# Patient Record
Sex: MALE | Race: WHITE | Employment: FULL TIME | URBAN - METROPOLITAN AREA
[De-identification: names, ages, dates, MRNs, and addresses within clinical notes are randomized per-mention and may not be internally consistent; named-entity substitution may affect disease eponyms.]

---

## 2017-04-04 ENCOUNTER — OFFICE VISIT (OUTPATIENT)
Dept: FAMILY MEDICINE CLINIC | Age: 52
End: 2017-04-04
Payer: COMMERCIAL

## 2017-04-04 VITALS
WEIGHT: 315 LBS | BODY MASS INDEX: 47 KG/M2 | OXYGEN SATURATION: 95 % | DIASTOLIC BLOOD PRESSURE: 76 MMHG | SYSTOLIC BLOOD PRESSURE: 115 MMHG | HEART RATE: 110 BPM

## 2017-04-04 DIAGNOSIS — I10 ESSENTIAL HYPERTENSION: ICD-10-CM

## 2017-04-04 DIAGNOSIS — E11.9 CONTROLLED TYPE 2 DIABETES MELLITUS WITHOUT COMPLICATION, WITHOUT LONG-TERM CURRENT USE OF INSULIN (HCC): Primary | ICD-10-CM

## 2017-04-04 LAB
CREATININE URINE: 200 MG/DL
HBA1C MFR BLD: 11 %
MICROALBUMIN/CREAT 24H UR: 150 MG/G{CREAT}

## 2017-04-04 PROCEDURE — 99213 OFFICE O/P EST LOW 20 MIN: CPT | Performed by: FAMILY MEDICINE

## 2017-04-04 PROCEDURE — 83036 HEMOGLOBIN GLYCOSYLATED A1C: CPT | Performed by: FAMILY MEDICINE

## 2017-04-04 RX ORDER — BLOOD-GLUCOSE METER
KIT MISCELLANEOUS
Qty: 1 KIT | Refills: 0 | Status: SHIPPED | OUTPATIENT
Start: 2017-04-04

## 2017-04-04 RX ORDER — LOSARTAN POTASSIUM 50 MG/1
50 TABLET ORAL DAILY
Qty: 30 TABLET | Refills: 5 | Status: SHIPPED | OUTPATIENT
Start: 2017-04-04 | End: 2017-07-06 | Stop reason: SDUPTHER

## 2017-04-04 ASSESSMENT — ENCOUNTER SYMPTOMS
SHORTNESS OF BREATH: 0
VISUAL CHANGE: 0
BLURRED VISION: 0

## 2017-04-10 ENCOUNTER — HOSPITAL ENCOUNTER (OUTPATIENT)
Age: 52
Discharge: HOME | End: 2017-04-10
Payer: COMMERCIAL

## 2017-04-10 DIAGNOSIS — E11.9 CONTROLLED TYPE 2 DIABETES MELLITUS WITHOUT COMPLICATION, WITHOUT LONG-TERM CURRENT USE OF INSULIN (HCC): ICD-10-CM

## 2017-04-10 DIAGNOSIS — I10 ESSENTIAL HYPERTENSION: ICD-10-CM

## 2017-04-10 LAB
ABSOLUTE EOS #: 0.6 K/UL (ref 0–0.4)
ABSOLUTE LYMPH #: 2.4 K/UL (ref 0.9–2.5)
ABSOLUTE MONO #: 0.5 K/UL (ref 0–1)
ALBUMIN SERPL-MCNC: 3.7 G/DL (ref 3.5–5.2)
ALBUMIN/GLOBULIN RATIO: ABNORMAL (ref 1–2.5)
ALP BLD-CCNC: 60 U/L (ref 40–129)
ALT SERPL-CCNC: 15 U/L (ref 5–41)
ANION GAP SERPL CALCULATED.3IONS-SCNC: 12 MMOL/L (ref 9–17)
AST SERPL-CCNC: 11 U/L
BASOPHILS # BLD: 1 % (ref 0–2)
BASOPHILS ABSOLUTE: 0.1 K/UL (ref 0–0.2)
BILIRUB SERPL-MCNC: 0.52 MG/DL (ref 0.3–1.2)
BUN BLDV-MCNC: 12 MG/DL (ref 6–20)
BUN/CREAT BLD: 13 (ref 9–20)
CALCIUM SERPL-MCNC: 9.4 MG/DL (ref 8.6–10.4)
CHLORIDE BLD-SCNC: 97 MMOL/L (ref 98–107)
CHOLESTEROL/HDL RATIO: 4.8
CHOLESTEROL: 172 MG/DL
CO2: 28 MMOL/L (ref 20–31)
CREAT SERPL-MCNC: 0.92 MG/DL (ref 0.7–1.2)
DIFFERENTIAL TYPE: YES
EOSINOPHILS RELATIVE PERCENT: 6 % (ref 0–5)
GFR AFRICAN AMERICAN: >60 ML/MIN
GFR NON-AFRICAN AMERICAN: >60 ML/MIN
GFR SERPL CREATININE-BSD FRML MDRD: ABNORMAL ML/MIN/{1.73_M2}
GFR SERPL CREATININE-BSD FRML MDRD: ABNORMAL ML/MIN/{1.73_M2}
GLUCOSE BLD-MCNC: 193 MG/DL (ref 70–99)
HCT VFR BLD CALC: 45.9 % (ref 41–53)
HDLC SERPL-MCNC: 36 MG/DL
HEMOGLOBIN: 15.2 G/DL (ref 13.5–17.5)
LDL CHOLESTEROL: 93 MG/DL (ref 0–130)
LYMPHOCYTES # BLD: 23 % (ref 13–44)
MCH RBC QN AUTO: 29.4 PG (ref 26–34)
MCHC RBC AUTO-ENTMCNC: 33.2 G/DL (ref 31–37)
MCV RBC AUTO: 88.7 FL (ref 80–100)
MONOCYTES # BLD: 5 % (ref 5–9)
PATIENT FASTING?: YES
PDW BLD-RTO: 13.9 % (ref 12.1–15.2)
PLATELET # BLD: 281 K/UL (ref 140–450)
PLATELET ESTIMATE: ABNORMAL
PMV BLD AUTO: ABNORMAL FL (ref 6–12)
POTASSIUM SERPL-SCNC: 4.3 MMOL/L (ref 3.7–5.3)
RBC # BLD: 5.18 M/UL (ref 4.5–5.9)
RBC # BLD: ABNORMAL 10*6/UL
SEG NEUTROPHILS: 65 % (ref 39–75)
SEGMENTED NEUTROPHILS ABSOLUTE COUNT: 6.7 K/UL (ref 2.1–6.5)
SODIUM BLD-SCNC: 137 MMOL/L (ref 135–144)
TOTAL PROTEIN: 7.3 G/DL (ref 6.4–8.3)
TRIGL SERPL-MCNC: 217 MG/DL
VLDLC SERPL CALC-MCNC: 43 MG/DL (ref 1–30)
WBC # BLD: 10.2 K/UL (ref 3.5–11)
WBC # BLD: ABNORMAL 10*3/UL

## 2017-04-10 PROCEDURE — 80053 COMPREHEN METABOLIC PANEL: CPT

## 2017-04-10 PROCEDURE — 36415 COLL VENOUS BLD VENIPUNCTURE: CPT

## 2017-04-10 PROCEDURE — 85025 COMPLETE CBC W/AUTO DIFF WBC: CPT

## 2017-04-10 PROCEDURE — 80061 LIPID PANEL: CPT

## 2017-04-11 ENCOUNTER — TELEPHONE (OUTPATIENT)
Dept: FAMILY MEDICINE CLINIC | Age: 52
End: 2017-04-11

## 2017-04-16 ASSESSMENT — ENCOUNTER SYMPTOMS
ABDOMINAL DISTENTION: 0
TROUBLE SWALLOWING: 0
COLOR CHANGE: 0
VOMITING: 0
WHEEZING: 0
PHOTOPHOBIA: 0
NAUSEA: 0
COUGH: 0
BACK PAIN: 0
FACIAL SWELLING: 0
CONSTIPATION: 0
DIARRHEA: 0
ABDOMINAL PAIN: 0

## 2017-07-06 ENCOUNTER — OFFICE VISIT (OUTPATIENT)
Dept: FAMILY MEDICINE CLINIC | Age: 52
End: 2017-07-06
Payer: COMMERCIAL

## 2017-07-06 VITALS
SYSTOLIC BLOOD PRESSURE: 138 MMHG | WEIGHT: 315 LBS | HEART RATE: 84 BPM | DIASTOLIC BLOOD PRESSURE: 84 MMHG | BODY MASS INDEX: 39.17 KG/M2 | HEIGHT: 75 IN

## 2017-07-06 DIAGNOSIS — I10 ESSENTIAL HYPERTENSION: ICD-10-CM

## 2017-07-06 DIAGNOSIS — E11.9 CONTROLLED TYPE 2 DIABETES MELLITUS WITHOUT COMPLICATION, WITHOUT LONG-TERM CURRENT USE OF INSULIN (HCC): ICD-10-CM

## 2017-07-06 PROCEDURE — 83036 HEMOGLOBIN GLYCOSYLATED A1C: CPT | Performed by: FAMILY MEDICINE

## 2017-07-06 PROCEDURE — 99214 OFFICE O/P EST MOD 30 MIN: CPT | Performed by: FAMILY MEDICINE

## 2017-07-06 RX ORDER — LOSARTAN POTASSIUM 50 MG/1
50 TABLET ORAL DAILY
Qty: 30 TABLET | Refills: 5 | Status: SHIPPED | OUTPATIENT
Start: 2017-07-06 | End: 2017-10-02 | Stop reason: SDUPTHER

## 2017-07-06 RX ORDER — LISINOPRIL 20 MG/1
20 TABLET ORAL DAILY
Qty: 30 TABLET | Refills: 5 | Status: SHIPPED | OUTPATIENT
Start: 2017-07-06 | End: 2017-10-02 | Stop reason: ALTCHOICE

## 2017-07-06 ASSESSMENT — ENCOUNTER SYMPTOMS
ORTHOPNEA: 0
BLURRED VISION: 0
SHORTNESS OF BREATH: 0

## 2017-07-06 ASSESSMENT — PATIENT HEALTH QUESTIONNAIRE - PHQ9
SUM OF ALL RESPONSES TO PHQ QUESTIONS 1-9: 0
SUM OF ALL RESPONSES TO PHQ9 QUESTIONS 1 & 2: 0
2. FEELING DOWN, DEPRESSED OR HOPELESS: 0
1. LITTLE INTEREST OR PLEASURE IN DOING THINGS: 0

## 2017-07-07 LAB — HBA1C MFR BLD: 8.8 %

## 2017-07-09 ASSESSMENT — ENCOUNTER SYMPTOMS
ABDOMINAL DISTENTION: 0
COLOR CHANGE: 0
VOMITING: 0
WHEEZING: 0
NAUSEA: 0
DIARRHEA: 0
COUGH: 0
CONSTIPATION: 0
TROUBLE SWALLOWING: 0
PHOTOPHOBIA: 0
FACIAL SWELLING: 0
ABDOMINAL PAIN: 0
BACK PAIN: 0

## 2017-07-13 ENCOUNTER — TELEPHONE (OUTPATIENT)
Dept: FAMILY MEDICINE CLINIC | Age: 52
End: 2017-07-13

## 2017-08-14 DIAGNOSIS — E11.9 CONTROLLED TYPE 2 DIABETES MELLITUS WITHOUT COMPLICATION, WITHOUT LONG-TERM CURRENT USE OF INSULIN (HCC): ICD-10-CM

## 2017-10-02 ENCOUNTER — OFFICE VISIT (OUTPATIENT)
Dept: FAMILY MEDICINE CLINIC | Age: 52
End: 2017-10-02
Payer: COMMERCIAL

## 2017-10-02 VITALS
WEIGHT: 315 LBS | OXYGEN SATURATION: 97 % | HEART RATE: 96 BPM | BODY MASS INDEX: 49 KG/M2 | SYSTOLIC BLOOD PRESSURE: 122 MMHG | DIASTOLIC BLOOD PRESSURE: 78 MMHG

## 2017-10-02 DIAGNOSIS — Z12.11 SCREENING FOR COLON CANCER: ICD-10-CM

## 2017-10-02 DIAGNOSIS — K64.9 HEMORRHOIDS, UNSPECIFIED HEMORRHOID TYPE: ICD-10-CM

## 2017-10-02 DIAGNOSIS — E11.9 CONTROLLED TYPE 2 DIABETES MELLITUS WITHOUT COMPLICATION, WITHOUT LONG-TERM CURRENT USE OF INSULIN (HCC): Primary | ICD-10-CM

## 2017-10-02 DIAGNOSIS — I10 ESSENTIAL HYPERTENSION: ICD-10-CM

## 2017-10-02 LAB — HBA1C MFR BLD: 8.8 %

## 2017-10-02 PROCEDURE — 99214 OFFICE O/P EST MOD 30 MIN: CPT | Performed by: FAMILY MEDICINE

## 2017-10-02 PROCEDURE — 83036 HEMOGLOBIN GLYCOSYLATED A1C: CPT | Performed by: FAMILY MEDICINE

## 2017-10-02 RX ORDER — LOSARTAN POTASSIUM 50 MG/1
50 TABLET ORAL DAILY
Qty: 30 TABLET | Refills: 5 | Status: SHIPPED | OUTPATIENT
Start: 2017-10-02 | End: 2018-04-23 | Stop reason: SDUPTHER

## 2017-10-02 ASSESSMENT — ENCOUNTER SYMPTOMS
VISUAL CHANGE: 0
SHORTNESS OF BREATH: 0
BLURRED VISION: 0

## 2017-10-02 NOTE — MR AVS SNAPSHOT
type 2 diabetes, stroke, gallstones, arthritis, sleep apnea, and certain cancers. BMI is not perfect. It may overestimate body fat in athletes and people who are more muscular. Even a small weight loss (between 5 and 10 percent of your current weight) by decreasing your calorie intake and becoming more physically active will help lower your risk of developing or worsening diseases associated with obesity. Learn more at: [email or web address]          Instructions      SURVEY:    You may be receiving a survey from [email or web address] regarding your visit today. Please complete the survey to enable us to provide the highest quality of care to you and your family. If you cannot score us as very good on any question, please call the office to discuss how we could have made your experience exceptional.     Thank you. Today's Medication Changes          These changes are accurate as of: 10/2/17  9:35 AM.  If you have any questions, ask your nurse or doctor.                START taking these medications           Dulaglutide 1.5 MG/0.5ML Sopn   Commonly known as:  TRULICITY   Instructions:  Inject 1.5 mg into the skin once a week   Quantity:  4 Pen   Refills:  5   Started by:  Whitney Cazares, DO         STOP taking these medications           lisinopril 20 MG tablet   Commonly known as:  PRINIVIL;ZESTRIL   Stopped by:  Whitney Cazares, DO            Where to Get Your Medications      These medications were sent to [address] [place] [address] [address] [place], [place] - [address]  [address] [address]     Phone:  [phone]     Dulaglutide 1.5 MG/0.5ML Sopn    losartan 50 MG tablet    metFORMIN 1000 MG tablet               Your Current Medications Are              losartan (COZAAR) 50 MG tablet Take 1 tablet by mouth daily    metFORMIN (GLUCOPHAGE) 1000 MG tablet Take 1 tablet by mouth 2 times daily (with meals) Dulaglutide (TRULICITY) 1.5 XR/9.3XU SOPN Inject 1.5 mg into the skin once a week    glucose monitoring kit (FREESTYLE) monitoring kit Test daily and as directed (whichever meter is covered by pt's insurance)    glucose blood VI test strips (ASCENSIA AUTODISC VI;ONE TOUCH ULTRA TEST VI) strip Test daily and as needed (whichever strips are covered by pt's insurance)    Insulin Pen Needle 32G X 4 MM MISC Use daily as directed      Allergies              Penicillins Itching      We Ordered/Performed the following           Jerrod Solitario MD, General Surgery [facility]     Scheduling Instructions:    [address] [facility] Dylan Hull MD  [address] [place] [address]  [phone]    Comments: The patient can be scheduled with any member of the group, including the provider with the first available appointments. POCT glycosylated hemoglobin (Hb A1C)          Result Summary for POCT glycosylated hemoglobin (Hb A1C)      Result Information       Status          Normal Final result (Collected: 10/2/2017  9:20 AM)           10/2/2017  9:20 AM      Component Results     Component Value Ref Range & Units Status    Hemoglobin A1C 8.8 % Final               Additional Information        Basic Information     Date Of Birth Sex Race Ethnicity Preferred Language    1965 Male White Non-/Non  English      Problem List as of 10/2/2017  Date Reviewed: 7/9/2017                Controlled type 2 diabetes mellitus without complication, without long-term current use of insulin ([facility] [address]    Essential hypertension      Preventive Care        Date Due    Hepatitis C screening is recommended for all adults regardless of risk factors born between [facility] at least once (lifetime) who have never been tested.  1965    Diabetic Foot Exam 9/22/1973    Eye Exam By An Eye Doctor 9/22/1973    HIV screening is recommended for all people regardless of risk factors

## 2017-10-02 NOTE — PROGRESS NOTES
HPI Notes    Name: Eder Murphy  : 1965         Chief Complaint:     Chief Complaint   Patient presents with    Diabetes    Hypertension       History of Present Illness:      Eder Murphy is a 45 y.o.  male who presents with Diabetes and Hypertension    Chronic Disease Visit Information    BP Readings from Last 3 Encounters:   10/02/17 122/78   17 138/84   17 115/76          Hemoglobin A1C (%)   Date Value   10/02/2017 8.8   2017 8.8   2017 11.0     LDL Cholesterol (mg/dL)   Date Value   04/10/2017 93     HDL (mg/dL)   Date Value   04/10/2017 36 (L)     BUN (mg/dL)   Date Value   04/10/2017 12     CREATININE (mg/dL)   Date Value   04/10/2017 0.92     Glucose (mg/dL)   Date Value   04/10/2017 193 (H)            Have you changed or started any medications since your last visit including any over-the-counter medicines, vitamins, or herbal medicines? no   Are you having any side effects from any of your medications? -  no  Have you stopped taking any of your medications? Is so, why? -  no    Have you seen any other physician or provider since your last visit? No  Have you had any other diagnostic tests since your last visit? No  Have you been seen in the emergency room and/or had an admission to a hospital since we last saw you? No  Have you had your annual diabetic retinal (eye) exam? Yes  Have you had your routine dental cleaning in the past 6 months? no    Have you activated your [employer] account? If not, what are your barriers?  Yes     Patient Care Team:  Oscar Alvarez DO as PCP - General (Family Medicine)         Medical History Review  Past Medical, Family, and Social History reviewed and does contribute to the patient presenting condition    Health Maintenance   Topic Date Due    Hepatitis C screen  1965    Diabetic foot exam  1973    Diabetic retinal exam  1973    HIV screen  1978    DTaP/Tdap/Td vaccine (1 - Tdap) 1982    evaluated. Patienthad some blood in his stool recently. Patient notes that he is also due for a screening colonoscopy. Patient would like to be referred for this for further evaluation and management. No chest pain, shortness breath, blurred vision, headaches. No other acute problems or complaints. Past Medical History:     Past Medical History:   Diagnosis Date    Chicken pox     Chronic back pain     DM (diabetes mellitus) ([address]     Hypertension       Reviewed all health maintenance requirements and ordered appropriate tests  Health Maintenance Due   Topic Date Due    Hepatitis C screen  1965    Diabetic foot exam  09/22/1973    Diabetic retinal exam  09/22/1973    HIV screen  09/22/1978    DTaP/Tdap/Td vaccine (1 - Tdap) 09/22/1982    Pneumococcal med risk (1 of 1 - PPSV23) 09/22/1982    Flu vaccine (1) 09/01/2017    Colon Cancer Screen FIT/FOBT  10/03/2017       Past Surgical History:     Past Surgical History:   Procedure Laterality Date    SHOULDER SURGERY  2006        Medications:       Prior to Admission medications    Medication Sig Start Date End Date Taking?  Authorizing Provider   losartan (COZAAR) 50 MG tablet Take 1 tablet by mouth daily 10/2/17  Yes Daniela Vital DO   metFORMIN (GLUCOPHAGE) 1000 MG tablet Take 1 tablet by mouth 2 times daily (with meals) 10/2/17  Yes Daniela Vital DO   Dulaglutide (TRULICITY) 1.5 IN/2.8TR SOPN Inject 1.5 mg into the skin once a week 10/2/17  Yes Daniela Vital, DO   glucose monitoring kit (FREESTYLE) monitoring kit Test daily and as directed (whichever meter is covered by pt's insurance) 4/4/17  Yes Daniela Vital DO   glucose blood VI test strips (ASCENSIA AUTODISC VI;ONE TOUCH ULTRA TEST VI) strip Test daily and as needed (whichever strips are covered by pt's insurance) 4/4/17  Yes Daniela Vital, DO   Insulin Pen Needle 32G X 4 MM MISC Use daily as directed 12/29/16  Yes Daniela Vital DO        Allergies: 1000 MG tablet    Dulaglutide (TRULICITY) 1.5 SJ/0.4PT SOPN   2. Essential hypertension  losartan (COZAAR) 50 MG tablet   3. Screening for colon cancer  Jerrod Gurrola MD, General Surgery [facility]   4. Hemorrhoids, unspecified hemorrhoid type  Jerrod Gurrola MD, General Surgery [address]:   1. Diabetes mellitus type 2improving slightly, A1c in the office today was 8.8, will increase patient's Trulicity to 1.5. Continue metformin, continue angiotensin receptor blocker. We'll continue to follow closely. 2.  Hypertensionwell controlled on losartan, will continue and will continue to follow. Labs reviewed. 3.  4.  Screen for colon cancer/hemorrhoidswill refer patient to Gen. surgery for further evaluation and management. Completed Refills   Requested Prescriptions     Signed Prescriptions Disp Refills    losartan (COZAAR) 50 MG tablet 30 tablet 5     Sig: Take 1 tablet by mouth daily    metFORMIN (GLUCOPHAGE) 1000 MG tablet 60 tablet 5     Sig: Take 1 tablet by mouth 2 times daily (with meals)    Dulaglutide (TRULICITY) 1.5 SH/4.3RC SOPN 4 Pen 5     Sig: Inject 1.5 mg into the skin once a week     No Follow-up on file.   Orders Placed This Encounter   Medications    losartan (COZAAR) 50 MG tablet     Sig: Take 1 tablet by mouth daily     Dispense:  30 tablet     Refill:  5    metFORMIN (GLUCOPHAGE) 1000 MG tablet     Sig: Take 1 tablet by mouth 2 times daily (with meals)     Dispense:  60 tablet     Refill:  5    Dulaglutide (TRULICITY) 1.5 TP/7.5IW SOPN     Sig: Inject 1.5 mg into the skin once a week     Dispense:  4 Pen     Refill:  5     Orders Placed This Encounter   Procedures    Jerrod Gurrola MD, General Surgery [facility]     Referral Priority:   Routine     Referral Type:   Consult for Advice and Opinion     Referral Reason:   Specialty Services Required     Referred to Provider:   Goran Brand MD     Requested Specialty:   General Surgery

## 2017-10-15 ASSESSMENT — ENCOUNTER SYMPTOMS
ABDOMINAL DISTENTION: 0
COUGH: 0
DIARRHEA: 0
NAUSEA: 0
COLOR CHANGE: 0
TROUBLE SWALLOWING: 0
WHEEZING: 0
BLOOD IN STOOL: 1
PHOTOPHOBIA: 0
FACIAL SWELLING: 0
CONSTIPATION: 0
VOMITING: 0
ABDOMINAL PAIN: 0
BACK PAIN: 0

## 2017-11-14 ENCOUNTER — INITIAL CONSULT (OUTPATIENT)
Dept: SURGERY | Age: 52
End: 2017-11-14

## 2017-11-14 VITALS — BODY MASS INDEX: 39.17 KG/M2 | WEIGHT: 315 LBS | HEIGHT: 75 IN

## 2017-11-14 DIAGNOSIS — Z80.0 FAMILY HISTORY OF COLON CANCER: ICD-10-CM

## 2017-11-14 DIAGNOSIS — E66.01 MORBID OBESITY WITH BMI OF 45.0-49.9, ADULT (HCC): ICD-10-CM

## 2017-11-14 DIAGNOSIS — Z12.11 ENCOUNTER FOR SCREENING COLONOSCOPY: Primary | ICD-10-CM

## 2017-11-14 PROCEDURE — 99024 POSTOP FOLLOW-UP VISIT: CPT | Performed by: SURGERY

## 2017-11-14 NOTE — LETTER
[id number] [place] [id number]  Phone: [phone]  Fax: [phone]    Becky Escamilla MD        November 26, 2017     DO [address]  [place] [address]    Patient: Hilario Jackson  MR Number: [id number]  YOB: 1965  Date of Visit: 11/14/2017    Dear Dr. Lang Wallis: Thank you for the request for consultation for Zeenat Blake to me for the evaluation of screening colonoscopy. Below are the relevant portions of my assessment and plan of care. Assessment:     1. Encounter for screening colonoscopy  EKG 12 Lead   2. Family history of colon cancer     3. Morbid obesity with BMI of 45.0-49.9, adult [facility])           Plan: Will proceed with a screening colonoscopy over the next few weeks. Risks, benefits, alternatives thoroughly reviewed and accepted by Mr Alejandro Reyes, including remote risk of GI bleeding, perforation, missed lesions, etc.   If you have questions, please do not hesitate to call me. I look forward to following Shayy Ward along with you.     Sincerely,        Becky Escamilla MD

## 2017-11-26 ASSESSMENT — ENCOUNTER SYMPTOMS
SORE THROAT: 0
SHORTNESS OF BREATH: 0
VOMITING: 0
BLOOD IN STOOL: 0
BACK PAIN: 0
ABDOMINAL PAIN: 0
COUGH: 0
TROUBLE SWALLOWING: 0
NAUSEA: 0
CHOKING: 0

## 2017-11-27 NOTE — PROGRESS NOTES
Subjective:      Patient ID: Zoey Sexton is a 45 y.o. male. HPI     Mr Aiyana Villalba is a 44 yo WM kindly referred to me by Dr Belinda Wilson for a screening colonoscopy. He has no complaints. No abdominal pain. No recent weight changes, stable at 380 lbs. Daily BM's, without blood. Colonoscopy  was normal, as he recalls. Mother with history of colon polyps, paternal grandfather  in his 66's with colon cancer. He does not smoke. Review of Systems   Constitutional: Negative for activity change, appetite change, chills, fever and unexpected weight change. HENT: Negative for nosebleeds, sneezing, sore throat and trouble swallowing. Eyes: Negative for visual disturbance. Respiratory: Negative for cough, choking and shortness of breath. Cardiovascular: Negative for chest pain, palpitations and leg swelling. Gastrointestinal: Negative for abdominal pain, blood in stool, nausea and vomiting. Genitourinary: Negative for dysuria, flank pain and hematuria. Musculoskeletal: Positive for arthralgias. Negative for back pain, gait problem and myalgias. Allergic/Immunologic: Negative for immunocompromised state. Neurological: Negative for dizziness, seizures, syncope, weakness and headaches. Hematological: Does not bruise/bleed easily. Psychiatric/Behavioral: Negative for confusion and sleep disturbance.         Past Medical History:   Diagnosis Date    Chicken pox     Chronic back pain     DM (diabetes mellitus) ([facility] [address]     Hypertension        Past Surgical History:   Procedure Laterality Date    COLONOSCOPY      SHOULDER SURGERY         Family History   Problem Relation Age of Onset    Diabetes Mother     High Blood Pressure Mother        Allergies:  See list    Current Outpatient Prescriptions   Medication Sig Dispense Refill    losartan (COZAAR) 50 MG tablet Take 1 tablet by mouth daily 30 tablet 5    metFORMIN (GLUCOPHAGE) 1000 MG tablet Take 1 tablet by mouth 2 times daily erythema. Psychiatric: He has a normal mood and affect. His behavior is normal. Judgment and thought content normal.   Nursing note and vitals reviewed.     4/10/2017 10:54 AM - [facility] Incoming Lab Results From [facility]     Component Results     Component Value Ref Range & Units Status Collected Lab   WBC 10.2  3.5 - 11.0 k/uL Final 04/10/2017 10:41 AM [facility]   RBC 5.18  4.5 - 5.9 m/uL Final 04/10/2017 10:41 AM [facility]   Hemoglobin 15.2  13.5 - 17.5 g/dL Final 04/10/2017 10:41 AM [facility]   Hematocrit 45.9  41 - 53 % Final 04/10/2017 10:41 AM [facility]   MCV 88.7  80 - 100 fL Final 04/10/2017 10:41 AM [facility]   MCH 29.4  26 - 34 pg Final 04/10/2017 10:41 AM [facility]   MCHC 33.2  31 - 37 g/dL Final 04/10/2017 10:41 AM [facility]   RDW 13.9  12.1 - 15.2 % Final 04/10/2017 10:41 AM [facility]   Platelets 147  229 - 450 k/uL Final 04/10/2017 10:41 AM [facility]   MPV NOT REPORTED  6.0 - 12.0 fL Final 04/10/2017 10:41 AM - [facility]   Differential Type YES   Final 04/10/2017 10:41 AM [facility]             4/10/2017 11:11 AM - Mate[facility], [facility] Incoming Lab Results From [facility]     Component Results     Component Value Ref Range & Units Status Collected Lab   Glucose 193   70 - 99 mg/dL Final 04/10/2017 10:41 AM [facility]   BUN 12  6 - 20 mg/dL Final 04/10/2017 10:41 AM [facility]   CREATININE 0.92  0.70 - 1.20 mg/dL Final 04/10/2017 10:41 AM [facility]   Bun/Cre Ratio 13  9 - 20 Final 04/10/2017 10:41 AM [facility]   Calcium 9.4  8.6 - 10.4 mg/dL Final 04/10/2017 10:41 AM [facility]   Sodium 137  135 - 144 mmol/L Final 04/10/2017 10:41 AM [facility]   Potassium 4.3  3.7 - 5.3 mmol/L Final 04/10/2017 10:41 AM [facility]   Chloride 97   98 - 107 mmol/L Final 04/10/2017 10:41 AM [facility]   CO2 28  20 - 31 mmol/L Final 04/10/2017 10:41 AM [facility]   Anion Gap 12  9 - 17 mmol/L Final 04/10/2017 10:41 AM [facility]   Alkaline Phosphatase 60  40 - 129 U/L Final 04/10/2017 10:41 AM [facility]   ALT 15  5 - 41 U/L Final 04/10/2017 10:41 AM [facility]   AST 11  <40 U/L Final 04/10/2017 10:41 AM [facility]   Total Bilirubin 0.52  0.3 - 1.2 mg/dL Final 04/10/2017 10:41 AM [facility]   Total Protein 7.3  6.4 - 8.3 g/dL Final 04/10/2017 10:41 AM [facility]   Alb 3.7  3.5 - 5.2 g/dL Final 04/10/2017 10:41 AM [facility]   Albumin/Globulin Ratio NOT REPORTED  1.0 - 2.5 Final 04/10/2017 10:41 AM - [facility]   GFR Non-African American >60  >60 mL/min Final 04/10/2017 10:41 AM [facility]   GFR African American >60  >60 mL/min Final 04/10/2017 10:41 AM [facility]   GFR             Assessment:      1. Encounter for screening colonoscopy  EKG 12 Lead   2. Family history of colon cancer     3. Morbid obesity with BMI of 45.0-49.9, adult [facility])           Plan: Will proceed with a screening colonoscopy over the next few weeks.   Risks, benefits, alternatives thoroughly reviewed and accepted by Mr Marlys Oliva, including remote risk of GI bleeding, perforation, missed lesions, etc.

## 2017-12-08 ENCOUNTER — HOSPITAL ENCOUNTER (OUTPATIENT)
Dept: GENERAL RADIOLOGY | Age: 52
Discharge: HOME | End: 2017-12-08
Payer: COMMERCIAL

## 2017-12-08 ENCOUNTER — HOSPITAL ENCOUNTER (OUTPATIENT)
Age: 52
Discharge: HOME | End: 2017-12-08
Payer: COMMERCIAL

## 2017-12-08 ENCOUNTER — OFFICE VISIT (OUTPATIENT)
Dept: FAMILY MEDICINE CLINIC | Age: 52
End: 2017-12-08
Payer: COMMERCIAL

## 2017-12-08 VITALS
OXYGEN SATURATION: 97 % | WEIGHT: 315 LBS | HEART RATE: 105 BPM | SYSTOLIC BLOOD PRESSURE: 136 MMHG | BODY MASS INDEX: 49.12 KG/M2 | DIASTOLIC BLOOD PRESSURE: 87 MMHG

## 2017-12-08 DIAGNOSIS — M25.561 ACUTE PAIN OF RIGHT KNEE: ICD-10-CM

## 2017-12-08 DIAGNOSIS — M25.561 ACUTE PAIN OF RIGHT KNEE: Primary | ICD-10-CM

## 2017-12-08 DIAGNOSIS — M25.361 KNEE INSTABILITY, RIGHT: ICD-10-CM

## 2017-12-08 PROCEDURE — 73562 X-RAY EXAM OF KNEE 3: CPT

## 2017-12-08 PROCEDURE — 99213 OFFICE O/P EST LOW 20 MIN: CPT | Performed by: FAMILY MEDICINE

## 2017-12-08 NOTE — PROGRESS NOTES
Patient presents today for right knee pain that occurred about 2 weeks ago.  Patient denies injury to area
tablet by mouth 2 times daily (with meals) 10/2/17  Yes Doris Bain, DO   Dulaglutide (TRULICITY) 1.5 ZE/6.0NK SOPN Inject 1.5 mg into the skin once a week 10/2/17  Yes Doris Bain, DO   glucose monitoring kit (FREESTYLE) monitoring kit Test daily and as directed (whichever meter is covered by pt's insurance) 4/4/17  Yes Doris Bain, DO   glucose blood VI test strips (ASCENSIA AUTODISC VI;ONE TOUCH ULTRA TEST VI) strip Test daily and as needed (whichever strips are covered by pt's insurance) 4/4/17  Yes Doris Bain, DO   Insulin Pen Needle 32G X 4 MM MISC Use daily as directed 12/29/16  Yes Doris Bain, DO        Allergies:       Penicillins    Social History:     Tobacco:    reports that he has never smoked. He has never used smokeless tobacco.  Alcohol:      reports that he does not drink alcohol. Drug Use:  reports that he does not use drugs. Family History:     Family History   Problem Relation Age of Onset    Diabetes Mother     High Blood Pressure Mother        Review of Systems:     Positive and Negative as described in HPI    Review of Systems   Constitutional: Negative for activity change, appetite change, fever and unexpected weight change. HENT: Negative for ear pain, facial swelling and trouble swallowing. Eyes: Negative for photophobia and visual disturbance. Respiratory: Negative for cough, shortness of breath and wheezing. Cardiovascular: Negative for chest pain and palpitations. Gastrointestinal: Negative for abdominal distention, abdominal pain, constipation, diarrhea, nausea and vomiting. Endocrine: Negative for polydipsia, polyphagia and polyuria. Musculoskeletal: Positive for arthralgias (right knee pain). Negative for back pain, gait problem, joint swelling, myalgias, neck pain and neck stiffness. Skin: Negative for color change and rash. Allergic/Immunologic: Negative for environmental allergies and food allergies.    Neurological: Negative for

## 2017-12-26 ASSESSMENT — ENCOUNTER SYMPTOMS
NAUSEA: 0
WHEEZING: 0
PHOTOPHOBIA: 0
BACK PAIN: 0
COUGH: 0
ABDOMINAL DISTENTION: 0
SHORTNESS OF BREATH: 0
VOMITING: 0
DIARRHEA: 0
FACIAL SWELLING: 0
TROUBLE SWALLOWING: 0
CONSTIPATION: 0
COLOR CHANGE: 0
ABDOMINAL PAIN: 0

## 2018-01-22 ENCOUNTER — NURSE ONLY (OUTPATIENT)
Dept: FAMILY MEDICINE CLINIC | Age: 53
End: 2018-01-22
Payer: COMMERCIAL

## 2018-01-22 DIAGNOSIS — E11.9 CONTROLLED TYPE 2 DIABETES MELLITUS WITHOUT COMPLICATION, WITHOUT LONG-TERM CURRENT USE OF INSULIN (HCC): Primary | ICD-10-CM

## 2018-01-22 LAB — HBA1C MFR BLD: 8.5 %

## 2018-01-22 PROCEDURE — 83036 HEMOGLOBIN GLYCOSYLATED A1C: CPT | Performed by: FAMILY MEDICINE

## 2018-01-22 NOTE — PROGRESS NOTES
Patient admits to eating desserts and food with high sugar over the holidays. Patient requesting to let him try to continue diet change and see what the sugar and A1c go from there, if another medication is needed after attempt at lifestyle change, then he would be willing to do so.

## 2018-04-23 ENCOUNTER — OFFICE VISIT (OUTPATIENT)
Dept: FAMILY MEDICINE CLINIC | Age: 53
End: 2018-04-23
Payer: COMMERCIAL

## 2018-04-23 VITALS
OXYGEN SATURATION: 98 % | DIASTOLIC BLOOD PRESSURE: 80 MMHG | BODY MASS INDEX: 49.25 KG/M2 | WEIGHT: 315 LBS | SYSTOLIC BLOOD PRESSURE: 120 MMHG | HEART RATE: 80 BPM

## 2018-04-23 DIAGNOSIS — N63.0 BREAST MASS IN MALE: ICD-10-CM

## 2018-04-23 DIAGNOSIS — I10 ESSENTIAL HYPERTENSION: ICD-10-CM

## 2018-04-23 DIAGNOSIS — E11.9 CONTROLLED TYPE 2 DIABETES MELLITUS WITHOUT COMPLICATION, WITHOUT LONG-TERM CURRENT USE OF INSULIN (HCC): Primary | ICD-10-CM

## 2018-04-23 DIAGNOSIS — R49.0 HOARSENESS OF VOICE: ICD-10-CM

## 2018-04-23 DIAGNOSIS — K21.9 GASTROESOPHAGEAL REFLUX DISEASE WITHOUT ESOPHAGITIS: ICD-10-CM

## 2018-04-23 LAB
CREATININE URINE POCT: 300
HBA1C MFR BLD: 8.7 %
MICROALBUMIN/CREAT 24H UR: 150 MG/G{CREAT}
MICROALBUMIN/CREAT UR-RTO: 30

## 2018-04-23 PROCEDURE — 99214 OFFICE O/P EST MOD 30 MIN: CPT | Performed by: FAMILY MEDICINE

## 2018-04-23 PROCEDURE — 83036 HEMOGLOBIN GLYCOSYLATED A1C: CPT | Performed by: FAMILY MEDICINE

## 2018-04-23 PROCEDURE — 82044 UR ALBUMIN SEMIQUANTITATIVE: CPT | Performed by: FAMILY MEDICINE

## 2018-04-23 RX ORDER — LOSARTAN POTASSIUM 50 MG/1
50 TABLET ORAL DAILY
Qty: 30 TABLET | Refills: 5 | Status: SHIPPED | OUTPATIENT
Start: 2018-04-23

## 2018-04-23 RX ORDER — FLUTICASONE PROPIONATE 50 MCG
1 SPRAY, SUSPENSION (ML) NASAL DAILY
Qty: 1 BOTTLE | Refills: 5 | Status: SHIPPED | OUTPATIENT
Start: 2018-04-23

## 2018-04-23 RX ORDER — PANTOPRAZOLE SODIUM 40 MG/1
40 TABLET, DELAYED RELEASE ORAL DAILY
Qty: 30 TABLET | Refills: 5 | Status: SHIPPED | OUTPATIENT
Start: 2018-04-23

## 2018-04-23 ASSESSMENT — PATIENT HEALTH QUESTIONNAIRE - PHQ9
SUM OF ALL RESPONSES TO PHQ9 QUESTIONS 1 & 2: 0
SUM OF ALL RESPONSES TO PHQ QUESTIONS 1-9: 0
1. LITTLE INTEREST OR PLEASURE IN DOING THINGS: 0
2. FEELING DOWN, DEPRESSED OR HOPELESS: 0

## 2018-04-25 ENCOUNTER — TELEPHONE (OUTPATIENT)
Dept: SURGERY | Age: 53
End: 2018-04-25

## 2018-04-26 ENCOUNTER — HOSPITAL ENCOUNTER (OUTPATIENT)
Dept: GENERAL RADIOLOGY | Age: 53
Discharge: HOME | End: 2018-04-28
Payer: COMMERCIAL

## 2018-04-26 ENCOUNTER — HOSPITAL ENCOUNTER (OUTPATIENT)
Dept: ULTRASOUND IMAGING | Age: 53
Discharge: HOME | End: 2018-04-28
Payer: COMMERCIAL

## 2018-04-26 ENCOUNTER — TELEPHONE (OUTPATIENT)
Dept: FAMILY MEDICINE CLINIC | Age: 53
End: 2018-04-26

## 2018-04-26 DIAGNOSIS — N63.0 BREAST MASS IN MALE: ICD-10-CM

## 2018-04-26 PROCEDURE — 76642 ULTRASOUND BREAST LIMITED: CPT

## 2018-04-26 PROCEDURE — 77066 DX MAMMO INCL CAD BI: CPT

## 2018-04-27 ENCOUNTER — TELEPHONE (OUTPATIENT)
Dept: FAMILY MEDICINE CLINIC | Age: 53
End: 2018-04-27

## 2018-04-27 DIAGNOSIS — N63.0 BREAST MASS IN MALE: Primary | ICD-10-CM

## 2018-05-01 ENCOUNTER — INITIAL CONSULT (OUTPATIENT)
Dept: SURGERY | Age: 53
End: 2018-05-01
Payer: COMMERCIAL

## 2018-05-01 VITALS
HEIGHT: 75 IN | DIASTOLIC BLOOD PRESSURE: 84 MMHG | WEIGHT: 315 LBS | RESPIRATION RATE: 20 BRPM | BODY MASS INDEX: 39.17 KG/M2 | HEART RATE: 90 BPM | SYSTOLIC BLOOD PRESSURE: 128 MMHG

## 2018-05-01 DIAGNOSIS — Z01.818 PREPROCEDURAL EXAMINATION: Primary | ICD-10-CM

## 2018-05-01 DIAGNOSIS — Z12.11 ENCOUNTER FOR SCREENING COLONOSCOPY: ICD-10-CM

## 2018-05-01 DIAGNOSIS — Z80.0 FAMILY HISTORY OF COLON CANCER: ICD-10-CM

## 2018-05-01 DIAGNOSIS — N63.20 LEFT BREAST MASS: ICD-10-CM

## 2018-05-01 PROCEDURE — 99214 OFFICE O/P EST MOD 30 MIN: CPT | Performed by: SURGERY

## 2018-05-06 ASSESSMENT — ENCOUNTER SYMPTOMS
DIARRHEA: 0
SHORTNESS OF BREATH: 0
COUGH: 0
ROS SKIN COMMENTS: BREAST MASS
WHEEZING: 0
TROUBLE SWALLOWING: 0
COLOR CHANGE: 0
ABDOMINAL DISTENTION: 0
VOMITING: 0
SHORTNESS OF BREATH: 0
COUGH: 0
ORTHOPNEA: 0
BLOOD IN STOOL: 0
TROUBLE SWALLOWING: 0
FACIAL SWELLING: 0
ABDOMINAL PAIN: 0
NAUSEA: 0
BACK PAIN: 0
BLURRED VISION: 0
VOMITING: 0
PHOTOPHOBIA: 0
SORE THROAT: 0
NAUSEA: 0
BACK PAIN: 0
CHOKING: 0
ABDOMINAL PAIN: 0
CONSTIPATION: 0

## 2018-05-07 ENCOUNTER — HOSPITAL ENCOUNTER (OUTPATIENT)
Age: 53
Discharge: HOME | End: 2018-05-07
Payer: COMMERCIAL

## 2018-05-07 DIAGNOSIS — Z01.818 PREPROCEDURAL EXAMINATION: ICD-10-CM

## 2018-05-07 LAB
EKG ATRIAL RATE: 103 BPM
EKG P AXIS: 62 DEGREES
EKG P-R INTERVAL: 162 MS
EKG Q-T INTERVAL: 336 MS
EKG QRS DURATION: 86 MS
EKG QTC CALCULATION (BAZETT): 440 MS
EKG R AXIS: 47 DEGREES
EKG T AXIS: 51 DEGREES
EKG VENTRICULAR RATE: 103 BPM

## 2018-05-07 PROCEDURE — 93005 ELECTROCARDIOGRAM TRACING: CPT

## 2018-05-10 ENCOUNTER — TELEPHONE (OUTPATIENT)
Dept: SURGERY | Age: 53
End: 2018-05-10

## 2020-02-17 ENCOUNTER — OFFICE VISIT (OUTPATIENT)
Dept: ENT CLINIC | Age: 55
End: 2020-02-17
Payer: COMMERCIAL

## 2020-02-17 VITALS
HEIGHT: 75 IN | WEIGHT: 315 LBS | TEMPERATURE: 97 F | HEART RATE: 120 BPM | BODY MASS INDEX: 39.17 KG/M2 | SYSTOLIC BLOOD PRESSURE: 160 MMHG | DIASTOLIC BLOOD PRESSURE: 94 MMHG | OXYGEN SATURATION: 94 %

## 2020-02-17 PROBLEM — R04.0 EPISTAXIS: Status: ACTIVE | Noted: 2020-02-17

## 2020-02-17 PROCEDURE — 30901 CONTROL OF NOSEBLEED: CPT | Performed by: PHYSICIAN ASSISTANT

## 2020-02-17 PROCEDURE — 99203 OFFICE O/P NEW LOW 30 MIN: CPT | Performed by: PHYSICIAN ASSISTANT

## 2020-02-17 RX ORDER — OMEGA-3/DHA/EPA/FISH OIL 60 MG-90MG
1000 CAPSULE ORAL
COMMUNITY
Start: 2019-09-13

## 2020-02-17 ASSESSMENT — ENCOUNTER SYMPTOMS
BACK PAIN: 0
APNEA: 0
CHEST TIGHTNESS: 0
RHINORRHEA: 0
BLOOD IN STOOL: 0
SINUS PRESSURE: 0
WHEEZING: 0
PHOTOPHOBIA: 0
NAUSEA: 0
FACIAL SWELLING: 0
SINUS PAIN: 0
RECTAL PAIN: 0
ABDOMINAL PAIN: 0
STRIDOR: 0
SHORTNESS OF BREATH: 0
VOICE CHANGE: 0
EYE REDNESS: 0
TROUBLE SWALLOWING: 0
DIARRHEA: 0
EYE DISCHARGE: 0
COUGH: 0
EYE PAIN: 0
ANAL BLEEDING: 0
CHOKING: 0
VOMITING: 0
SORE THROAT: 0
COLOR CHANGE: 0
CONSTIPATION: 0
EYE ITCHING: 0
ABDOMINAL DISTENTION: 0

## 2020-02-17 NOTE — PROGRESS NOTES
[facility] [address], [facility]  [address] [facility] [place] [address]  Dept: [phone]   Huel Pallas, PA-C Pearley Mohr, MD (supervising physician)    Jessica Whiteside 62 y.o. male     Patient presents with a chief complaint of Epistaxis (occurs every couple of days)       BP (!) 160/94 (Site: Right Upper Arm, Position: Sitting)   Pulse 120   Temp 97 °F (36.1 °C) (Oral)   Ht 6' 3\" (1.905 m)   Wt (!) 413 lb 6.4 oz (187.5 kg)   SpO2 94%   BMI 51.67 kg/m²      Elevated BP discussed with Pt. Pt advised elevated BP/uncontrolled HTN #1 risk factor for stroke. Doesn't have a BP cuff at home, but does have access to one. I discussed with Pt monitoring BP at home on a routine basis. Pt says it is high when he is at the doctor's office, but can't say if it's high at home too because he doesn't check it at home. Sees PCP every 3 months routinely and at last visit discussed changing/increasing medications at next visit if still high. History of Presenting Illness: The patient/caregiver reports a history of complaint with the following features: Onset:  Onset of nasal bleeding a \"couple\" years ago. Getting worse as far as getting more frequent. Last had one last night. Put tissue up his nose and pressed and was done bleeding within 30 seconds. Timing:  Worsening frequency in the last 6 months. Used to work at [email or web address] (indicates exposure to lime and talc) and thinks that is when the nasal bleeding started. Hasn't worked there in 8 years however. Frequency:  Was once a month initially. Now occurring about every other day. Duration:  < 1 minute  Quality:  Nasal bleeding  Location:  Left side of nose  Severity:   Denies nasal pain/tenderness. Associated symptoms/other symptoms:  Nasal congestion? Only if he gets a \"head cold\". Denies recent illness. Nasal drainage? Denies  Nasal dryness? \"Sometimes\".   Sometimes when he gets out of the removed before (years ago) and the mass of neck showed up about 2 years after. He indicates he has been told the neck mass is likely the same thing. No prior imaging that Pt is aware of. Pt indicates that PCP has been monitoring it. The neck mass doesn't drain, doesn't get painful/tender, and hasn't changed in years. Denies increase in size. Review of systems covering 10 systems is reviewed as staff entry in other note and pertinent positives and negatives noted.     Past Medical History:   Diagnosis Date    Chicken pox     Chronic back pain     DM (diabetes mellitus) (HCC)     Hypertension        Current Outpatient Medications:     Omega-3 Fatty Acids (FISH OIL) 500 MG CAPS, Take 1,000 mg by mouth, Disp: , Rfl:     losartan (COZAAR) 50 MG tablet, Take 1 tablet by mouth daily, Disp: 30 tablet, Rfl: 5    fluticasone (FLONASE) 50 MCG/ACT nasal spray, 1 spray by Nasal route daily, Disp: 1 Bottle, Rfl: 5    pantoprazole (PROTONIX) 40 MG tablet, Take 1 tablet by mouth daily, Disp: 30 tablet, Rfl: 5    SITagliptin-metFORMIN HCl ER (JANUMET XR) 100-1000 MG TB24, Take 1 tablet by mouth daily, Disp: 30 tablet, Rfl: 5    glucose monitoring kit (FREESTYLE) monitoring kit, Test daily and as directed (whichever meter is covered by pt's insurance), Disp: 1 kit, Rfl: 0    glucose blood VI test strips (ASCENSIA AUTODISC VI;ONE TOUCH ULTRA TEST VI) strip, Test daily and as needed (whichever strips are covered by pt's insurance), Disp: 100 each, Rfl: 3    Insulin Pen Needle 32G X 4 MM MISC, Use daily as directed, Disp: 100 each, Rfl: 3   Allergies   Allergen Reactions    Dulaglutide Nausea Only    Penicillins Itching      Past Surgical History:   Procedure Laterality Date    COLONOSCOPY  44    SHOULDER SURGERY  2006      Social History     Socioeconomic History    Marital status:      Spouse name: Not on file    Number of children: Not on file    Years of education: Not on file    Highest education level: Not on file   Occupational History    Not on file   Social Needs    Financial resource strain: Not on file    Food insecurity:     Worry: Not on file     Inability: Not on file    Transportation needs:     Medical: Not on file     Non-medical: Not on file   Tobacco Use    Smoking status: Never Smoker    Smokeless tobacco: Never Used   Substance and Sexual Activity    Alcohol use: No    Drug use: No    Sexual activity: Not on file   Lifestyle    Physical activity:     Days per week: Not on file     Minutes per session: Not on file    Stress: Not on file   Relationships    Social connections:     Talks on phone: Not on file     Gets together: Not on file     Attends Yazidism service: Not on file     Active member of club or organization: Not on file     Attends meetings of clubs or organizations: Not on file     Relationship status: Not on file    Intimate partner violence:     Fear of current or ex partner: Not on file     Emotionally abused: Not on file     Physically abused: Not on file     Forced sexual activity: Not on file   Other Topics Concern    Not on file   Social History Narrative    Not on file     Family History   Problem Relation Age of Onset    Diabetes Mother     High Blood Pressure Mother     Cancer Paternal Grandfather         Colon Cancer        PHYSICAL EXAM:    The patient was examined today 2/17/2020 with findings as follows:    CONSTITUTIONAL:    General Appearance: well-appearing, nontoxic, alert, no acute distress, morbidly obese     Communication: understanding at normal conversational tones, normal voicing, speech intelligible  No conversational dyspnea. Speaks in several word sentences/phrases. No stridor.     HEAD/FACE:    Head: atraumatic, normocephalic, no lesions    Facial Inspection: no lesions, healthy skin    Facial Strength: motor strength normal, symmetric strength, symmetric movement    Sinuses: no sinus tenderness (frontal, ethmoid, normal thyroid, no enlargement, no tenderness, no nodules    LYMPH NODES:    Cervical: no palpable lymph node enlargement    SKIN:    General Appearance:  warm and dry, no bruising or petechiae/purpura of exposed skin    NEUROLOGICAL SYSTEM:    Orientation: oriented to time, oriented to place, oriented to person, oriented to situation    Cranial Nerves: Cranial Nerves II-XII intact, normal facial movement    PSYCHIATRIC:    Mood and affect:  Affect appropriate for situation/setting. CONTROL OF EPISTAXIS-SIMPLE PROCEDURE [id number](50753)    PROCEDURE PERFORMED BY: DORIS DAVIS    PROCEDURE DATE: 2/17/2020  Pre-procedure discussed with Pt risk of septal perforation as well as the recurrence of nasal bleeding. With the patients consent, any previously placed packing material, if present, is removed. The nasal cavity is examined by anterior rhinoscopy and the site of bleeding identified at the left anterior nasal septum and inferior nasal turbinate. With a silver nitrate stick the area is then cauterized, followed by a cotton gauze overlay. This is removed and the site examined. Excellent cautery of the site and no bleeding is noted. The procedure is well tolerated and without complication. The patient and/or caregiver is instructed on the use of Bacitracin ointment to be applied daily and to avoid trauma during the healing process, as well as the use of anterior nasal pressure for control of bleeding in the event of recurrent bleeding. Assessment and Plan:  The patient and/or caregiver is advised on the application of anterior nasal pressure for the control of repeat bleeding and is able to demonstrate this in the office. I have advised the use of saline nasal rinses for moisturization of the nasal cavities and an informational sheet on the preparation and use of saline is provided.  We have discussed the topical application of Bacitracin or other antibiotic ointment to the anterior nasal septum twice daily as well as the maintenance of proper humidity in the home and the avoidance of nasal trauma to prevent further bleeding. The patient and/or caregiver is to notify the office if no improvement or worsening of symptoms is noted prior to the scheduled follow-up for sooner evaluation. The patient and/or caregiver is able to state an understanding of these recommendations and is agreeable to the treatment plan. Confirmed Pt currently not using Flonase. Advised to continue to hold off on using it while he is having issues with nasal bleeding. The patient and/or caregiver is to notify the office if no improvement or worsening of symptoms is noted prior to the scheduled follow-up for sooner evaluation. The patient and/or caregiver is able to state an understanding of these recommendations and is agreeable to the treatment plan. Diagnosis Orders   1.  Epistaxis

## 2020-02-17 NOTE — PATIENT INSTRUCTIONS
Apply over-the-counter antibiotic ointment  (like Bacitracin, triple antibiotic ointment or Neosporin) to the nasal mucosa twice daily. Use a humidifier, especially in the bedroom, if you are not currently using one. NASAL SALINE (SALTWATER) RINSES     Your doctor has recommended the use of saline (salt water) nasal rinses. Nasal rinses have been used for centuries for the treatment of nasal and sinus infection, bothersome secretions, allergy, and nasal dryness. It is safe and effective for use in both children and adults. Daily rinsing of the nasal passages with saline promotes nasal and sinus health by washing away dried mucus, infected secretions, dust, pollen, and mold spores, by moisturizing the nasal lining, promoting normal mucus flow, and naturally decongesting inflamed tissues. Rinses also help with clearing dried blood, mucus, and infection after sinus surgery and promote rapid healing of tissue in this setting. In the beginning it is normal to have some difficulty with performing nasal rinses, but with practice, most people find that it becomes part of their normal routine just as much as brushing teeth or showering, and often, most wouldnt think of leaving home without it. RINSE INGREDIENTS:  No special tools are required for nasal saline rinses, and everything that you need can be picked up at your local pharmacy and grocery. Commercial saline rinses and syringe systems such as NeilMed SINUS RINSE, Ayr Saline Nasal Rinse, or Neti Pot are available, but these may contain preservatives or other irritants, and it is often less expensive and more convenient to make it at home.   You will need the following:  Distilled water (tap water contains irritating minerals and bacteria, but may be used if boiled)  Kosher, pickling, or non-iodized table salt (regular table salt contains iodine, an irritant)  Baking soda (not baking powder)  Corn syrup (optional, for additional moisturizing effect)  White vinegar (for antiseptic effect, if recommended by your doctor)  An airtight container for mixing saline rinse  Rubber bulb syringe (like those used to clear infant noses and ears)  Household bleach (for cleaning container and bulb syringe)    PREPARATION:  To prepare the rinses, measure out 1 quart (1 liter) of distilled or boiled tap water into the mixing container. Then add 2-3 heaping teaspoons of salt and 1 teaspoon of baking soda, mix to dissolve, and place in refrigerator for 1-2 days of storage. You may add 3-4 tablespoons of corn syrup if you experience nasal dryness. If your doctor recommends it, add 1 teaspoon of white vinegar to this mixture as well. STORAGE & CLEANING:  It is advised that you make up fresh rinse every day or two, and that you keep the unused rinse in the refrigerator in an airtight container to prevent bacterial growth. Set out enough rinse for your next use well in advance to let it come to room temperature before use. Wash the container and bulb syringe at least once a week in a quart of water with 2 tablespoons of bleach, rinse and dry to prevent bacterial growth    USING SALINE RINSES:  To perform nasal saline rinses, plan on using at least 1 pint (2 cups) twice daily. Lean over a sink or other basin (some people prefer to do this in the shower as it can be messy, especially when you first start) to catch the rinse as it drains from your nose and mouth. Fill the bulb syringe with the rinse solution and place it into the nostril on one side. Gently squeeze the bulb to flush the nasal passage until the rinse drains clear. Repeat on the other side. You should plan on using the rinses twice a day, which should take about 10 minutes to perform. OTHER MEDICATIONS:  If your doctor has prescribed other nasal spray medications, such as a nasal steroid, it is best to apply these after the nasal rinse so that you do not wash the medication away.   It is also safe to continue with your other antihistamine or decongestant medications that your doctor may have prescribed in addition to the saline rinses. If you have an allergy or adverse reaction to any of the ingredients do not use it in the preparation of the saline rinses. WHEN TO CALL US:  Stop the rinses and notify your doctor if you experience severe pain in the nose or ears, bleeding, or any other problems with the use of the nasal saline rinses. It is normal, especially in the beginning, to experience drainage of saline and nasal secretions into the throat. This is best avoided by holding your breath and leaning forward when using the rinses. Experienced users often will have the rinse exit the opposite nostril without drainage into the throat at all, and this can be achieved by most people with practice. Please call us if you have any additional questions or concerns. NOTICE:  THIS DOCUMENT IS INTENDED SOLELY FOR PATIENT EDUCATIONAL PURPOSES AND IS PROVIDED AS A COURTESY TO PATIENTS OF DR. Staci Palm MD AND Omar Barnes PA-C. IT IS NOT INTENDED AS A SUBSTITUTE FOR PROFESSIONAL MEDICAL CARE OR ADVICE. THE PATIENT SHOULD SEEK ADVICE FROM THE PHYSICIAN IF THERE ARE ANY QUESTIONS ABOUT THE CONTENTS OR DIRECTIONS PROVIDED IN THIS DOCUMENT.